# Patient Record
Sex: FEMALE | Race: WHITE
[De-identification: names, ages, dates, MRNs, and addresses within clinical notes are randomized per-mention and may not be internally consistent; named-entity substitution may affect disease eponyms.]

---

## 2018-03-22 ENCOUNTER — HOSPITAL ENCOUNTER (EMERGENCY)
Dept: HOSPITAL 80 - CED | Age: 28
Discharge: HOME | End: 2018-03-22
Payer: COMMERCIAL

## 2018-03-22 VITALS — OXYGEN SATURATION: 96 % | SYSTOLIC BLOOD PRESSURE: 112 MMHG | HEART RATE: 79 BPM | DIASTOLIC BLOOD PRESSURE: 74 MMHG

## 2018-03-22 VITALS — RESPIRATION RATE: 16 BRPM | TEMPERATURE: 97.7 F

## 2018-03-22 DIAGNOSIS — F17.200: ICD-10-CM

## 2018-03-22 DIAGNOSIS — E86.9: ICD-10-CM

## 2018-03-22 DIAGNOSIS — R10.2: Primary | ICD-10-CM

## 2018-03-22 LAB — PLATELET # BLD: 308 10^3/UL (ref 150–400)

## 2018-03-22 NOTE — EDPHY
H & P


Stated Complaint: pelvic cramping this am and more sever since noon .


Time Seen by Provider: 03/22/18 15:36


HPI/ROS: 





Chief Complaint:  Pelvic pain





HPI:  28-year-old woman, G0, began having severe lower pelvic cramping this 

morning.  Last normal menstrual.  Was about a month ago.  She did have an IUD 

removed in December because she and her boyfriend trying to get pregnant.  Had 

some brownish discharge this morning and then started having some vaginal 

bleeding as well.  Pain is a 10/10.  She took 600 mg of ibuprofen 2 hr ago with 

no relief.  No nausea or vomiting.  Has also had some urinary frequency over 

the last day or 2.  No back pain.  No dysuria.  No fevers or chills.  No nausea 

or vomiting.  There are no other aggravating or alleviating factors.  Last 

intercourse was yesterday.  Pain was not sudden in onset.  Does not feel like 

prior menstrual cramping.





ROS:  10 point Review of Systems is negative except as noted in the HPI.





PMH:  Denies





Social History:  Occasional smoking, occasional alcohol, occasional marijuana





Family History: non-contributory





Physical Exam:


Gen: Awake, Alert, No Distress


HEENT:  


     Nose: no rhinorrhea


     Eyes: PERRLA, EOMI


     Mouth: Moist mucosa 


Neck: Supple, no JVD


Chest: nontender, lungs clear to auscultation


Heart: S1, S2 normal, no murmur


Abd: Soft, moderate tenderness in the left adnexa and mild suprapubic, right 

pelvis and abdomen are nontender, no guarding


Back: no CVA tenderness, no midline tenderness 


Ext: no edema, non-tender


Skin: no rash


Neuro: CN II-XII intact, Sensation grossly intact, Strength 5/5 in bilateral 

upper and lower extremities








- Personal History


LMP (Females 10-55): Now


Current Tetanus/Diphtheria Vaccine: Unsure


Current Tetanus Diphtheria and Acellular Pertussis (TDAP): Unsure





- Medical/Surgical History


Hx Asthma: No


Hx Chronic Respiratory Disease: No


Hx Diabetes: No


Hx Cardiac Disease: No


Hx Renal Disease: No


Hx Cirrhosis: No


Hx Alcoholism: No


Hx HIV/AIDS: No


Hx Splenectomy or Spleen Trauma: No


Other PMH: ankle surgery





- Social History


Smoking Status: Current some day smoker


Constitutional: 


 Initial Vital Signs











Temperature (C)  36.5 C   03/22/18 15:40


 


Heart Rate  66   03/22/18 15:40


 


Respiratory Rate  16   03/22/18 15:40


 


Blood Pressure  129/77 H  03/22/18 15:40


 


O2 Sat (%)  97   03/22/18 15:40








 











O2 Delivery Mode               Room Air














Allergies/Adverse Reactions: 


 





No Known Allergies Allergy (Verified 03/22/18 15:46)


 








Home Medications: 














 Medication  Instructions  Recorded


 


NK [No Known Home Meds]  03/22/18














Medical Decision Making





- Diagnostics


Imaging Results: 


 Imaging Impressions





Pelvic/Renal Ultrasound  03/22/18 15:57


Impression:  Normal pelvic ultrasound.


 


Results called and discussed with Kraig Hammond M.D., on March 22, 2018 at 

1728.











Imaging: Discussed imaging studies w/ On call Radiologist


ED Course/Re-evaluation: 





Pelvic ultrasound is negative.  She is not pregnant.  Blood work is 

unremarkable.  Patient is improved here.  Symptoms are likely secondary to 

menstrual cramping after having recently had a IUD removed.  Will discharge 

with follow up with OBGYN, return for any concerns.





- Data Points


Laboratory Results: 


 Laboratory Results





 03/22/18 16:00 





 03/22/18 16:00 





 











  03/22/18 03/22/18 03/22/18





  16:40 16:00 16:00


 


WBC      





    


 


RBC      





    


 


Hgb      





    


 


Hct      





    


 


MCV      





    


 


MCH      





    


 


MCHC      





    


 


RDW      





    


 


Plt Count      





    


 


MPV      





    


 


Neut % (Auto)      





    


 


Lymph % (Auto)      





    


 


Mono % (Auto)      





    


 


Eos % (Auto)      





    


 


Baso % (Auto)      





    


 


Nucleat RBC Rel Count      





    


 


Absolute Neuts (auto)      





    


 


Absolute Lymphs (auto)      





    


 


Absolute Monos (auto)      





    


 


Absolute Eos (auto)      





    


 


Absolute Basos (auto)      





    


 


Absolute Nucleated RBC      





    


 


Immature Gran %      





    


 


Immature Gran #      





    


 


Sodium      140 mEq/L mEq/L





     (135-145) 


 


Potassium      4.3 mEq/L mEq/L





     (3.5-5.2) 


 


Chloride      105 mEq/L mEq/L





     () 


 


Carbon Dioxide      24 mEq/l mEq/l





     (22-31) 


 


Anion Gap      11 mEq/L mEq/L





     (8-16) 


 


BUN      14 mg/dL mg/dL





     (7-23) 


 


Creatinine      0.7 mg/dL mg/dL





     (0.6-1.0) 


 


Estimated GFR      > 60 





    


 


Glucose      77 mg/dL mg/dL





     () 


 


Calcium      9.2 mg/dL mg/dL





     (8.5-10.4) 


 


Beta HCG, Qual    NEGATIVE   





    


 


Urine Color  YELLOW     





    


 


Urine Appearance  CLEAR     





    


 


Urine pH  6.5     





   (5.0-7.5)   


 


Ur Specific Gravity  <= 1.005     





   (1.002-1.030)   


 


Urine Protein  NEGATIVE     





   (NEGATIVE)   


 


Urine Ketones  NEGATIVE     





   (NEGATIVE)   


 


Urine Blood  TRACE  H     





   (NEGATIVE)   


 


Urine Nitrate  NEGATIVE     





   (NEGATIVE)   


 


Urine Bilirubin  NEGATIVE     





   (NEGATIVE)   


 


Urine Urobilinogen  0.2 EU EU    





   (0.2-1.0)   


 


Ur Leukocyte Esterase  NEGATIVE     





   (NEGATIVE)   


 


Urine RBC  0-1 /hpf /hpf    





   (0-3)   


 


Urine WBC  NONE SEEN /hpf /hpf    





   (0-3)   


 


Ur Epithelial Cells  TRACE /lpf /lpf    





   (NONE-1+)   


 


Urine Bacteria  TRACE /hpf H /hpf    





   (NONE SEEN)   


 


Urine Glucose  NEGATIVE     





   (NEGATIVE)   














  03/22/18





  16:00


 


WBC  9.46 10^3/uL 10^3/uL





   (3.80-9.50) 


 


RBC  4.27 10^6/uL 10^6/uL





   (4.18-5.33) 


 


Hgb  13.1 g/dL g/dL





   (12.6-16.3) 


 


Hct  37.8 % L %





   (38.0-47.0) 


 


MCV  88.5 fL fL





   (81.5-99.8) 


 


MCH  30.7 pg pg





   (27.9-34.1) 


 


MCHC  34.7 g/dL g/dL





   (32.4-36.7) 


 


RDW  12.7 % %





   (11.5-15.2) 


 


Plt Count  308 10^3/uL 10^3/uL





   (150-400) 


 


MPV  9.0 fL fL





   (8.7-11.7) 


 


Neut % (Auto)  78.6 % H %





   (39.3-74.2) 


 


Lymph % (Auto)  14.5 % L %





   (15.0-45.0) 


 


Mono % (Auto)  5.6 % %





   (4.5-13.0) 


 


Eos % (Auto)  0.7 % %





   (0.6-7.6) 


 


Baso % (Auto)  0.3 % %





   (0.3-1.7) 


 


Nucleat RBC Rel Count  0.0 % %





   (0.0-0.2) 


 


Absolute Neuts (auto)  7.43 10^3/uL H 10^3/uL





   (1.70-6.50) 


 


Absolute Lymphs (auto)  1.37 10^3/uL 10^3/uL





   (1.00-3.00) 


 


Absolute Monos (auto)  0.53 10^3/uL 10^3/uL





   (0.30-0.80) 


 


Absolute Eos (auto)  0.07 10^3/uL 10^3/uL





   (0.03-0.40) 


 


Absolute Basos (auto)  0.03 10^3/uL 10^3/uL





   (0.02-0.10) 


 


Absolute Nucleated RBC  0.00 10^3/uL 10^3/uL





   (0-0.01) 


 


Immature Gran %  0.3 % %





   (0.0-1.1) 


 


Immature Gran #  0.03 10^3/uL 10^3/uL





   (0.00-0.10) 


 


Sodium  





  


 


Potassium  





  


 


Chloride  





  


 


Carbon Dioxide  





  


 


Anion Gap  





  


 


BUN  





  


 


Creatinine  





  


 


Estimated GFR  





  


 


Glucose  





  


 


Calcium  





  


 


Beta HCG, Qual  





  


 


Urine Color  





  


 


Urine Appearance  





  


 


Urine pH  





  


 


Ur Specific Gravity  





  


 


Urine Protein  





  


 


Urine Ketones  





  


 


Urine Blood  





  


 


Urine Nitrate  





  


 


Urine Bilirubin  





  


 


Urine Urobilinogen  





  


 


Ur Leukocyte Esterase  





  


 


Urine RBC  





  


 


Urine WBC  





  


 


Ur Epithelial Cells  





  


 


Urine Bacteria  





  


 


Urine Glucose  





  











Medications Given: 


 








Discontinued Medications





Morphine Sulfate (Morphine)  4 mg IVP ONCE ONE


   Stop: 03/22/18 16:52


   Last Admin: 03/22/18 17:39 Dose:  Not Given


Morphine Sulfate (Morphine)  4 mg IVP EDNOW ONE


   Stop: 03/22/18 17:31


   Last Admin: 03/22/18 17:30 Dose:  4 mg








Departure





- Departure


Disposition: Home, Routine, Self-Care


Clinical Impression: 


 Pelvic pain in female





Condition: Good


Instructions:  Pelvic Pain in Women (ED)


Additional Instructions: 


Follow up with OBGYN in 3-4 days.


May take ibuprofen, 600 mg 3 times a day.


You may take acetaminophen, 1000 mg every 6 hr.  Do not exceed 3000 mg in 24 hr.


Return to the emergency department for increasing pain, uncontrolled nausea 

vomiting, fevers, chills, or any other concerns.


Referrals: 


Francia Perez DO [Doctor of Osteopathy] - As per Instructions